# Patient Record
Sex: MALE | Race: WHITE | Employment: FULL TIME | URBAN - NONMETROPOLITAN AREA
[De-identification: names, ages, dates, MRNs, and addresses within clinical notes are randomized per-mention and may not be internally consistent; named-entity substitution may affect disease eponyms.]

---

## 2017-02-16 RX ORDER — CYCLOBENZAPRINE HCL 10 MG
10 TABLET ORAL EVERY 8 HOURS PRN
Qty: 40 TABLET | Refills: 1 | Status: SHIPPED | OUTPATIENT
Start: 2017-02-16 | End: 2018-02-26

## 2018-02-26 ENCOUNTER — OFFICE VISIT (OUTPATIENT)
Dept: FAMILY MEDICINE CLINIC | Age: 38
End: 2018-02-26
Payer: COMMERCIAL

## 2018-02-26 ENCOUNTER — TELEPHONE (OUTPATIENT)
Dept: FAMILY MEDICINE CLINIC | Age: 38
End: 2018-02-26

## 2018-02-26 VITALS
HEART RATE: 78 BPM | HEIGHT: 76 IN | OXYGEN SATURATION: 97 % | TEMPERATURE: 97.6 F | RESPIRATION RATE: 14 BRPM | WEIGHT: 237.4 LBS | SYSTOLIC BLOOD PRESSURE: 120 MMHG | DIASTOLIC BLOOD PRESSURE: 84 MMHG | BODY MASS INDEX: 28.91 KG/M2

## 2018-02-26 DIAGNOSIS — M79.89 SWELLING OF RIGHT INDEX FINGER: Primary | ICD-10-CM

## 2018-02-26 PROCEDURE — 99203 OFFICE O/P NEW LOW 30 MIN: CPT | Performed by: FAMILY MEDICINE

## 2018-02-26 PROCEDURE — 29130 APPL FINGER SPLINT STATIC: CPT | Performed by: FAMILY MEDICINE

## 2018-02-26 ASSESSMENT — PATIENT HEALTH QUESTIONNAIRE - PHQ9
1. LITTLE INTEREST OR PLEASURE IN DOING THINGS: 0
SUM OF ALL RESPONSES TO PHQ9 QUESTIONS 1 & 2: 0
SUM OF ALL RESPONSES TO PHQ QUESTIONS 1-9: 0
2. FEELING DOWN, DEPRESSED OR HOPELESS: 0

## 2018-02-26 NOTE — PROGRESS NOTES
Resp: 14   Temp: 97.6 °F (36.4 °C)   SpO2: 97%   Weight: 237 lb 6.4 oz (107.7 kg)   Height: 6' 4.42\" (1.941 m)       Physical Exam   Constitutional: He appears well-developed and well-nourished. HENT:   Head: Normocephalic and atraumatic. Mouth/Throat: Oropharynx is clear and moist.   Eyes: Conjunctivae are normal. Pupils are equal, round, and reactive to light. Neck: Neck supple. Cardiovascular: Normal rate, regular rhythm and normal heart sounds. Pulmonary/Chest: Effort normal and breath sounds normal. No respiratory distress. Abdominal: Soft. There is no tenderness. Musculoskeletal:   Right index finger swollen at MCP joint. Able to flex and extend finger. Normal range of motion of PIP and DIP joints. Mild limitation in range of motion at MCP joint likely secondary to swelling. No swelling of other MCP joints or any other joints on hand. No redness, warmth, or streaking to suggest infection. No obvious or palpable deformity. No locking, catching or palmar nodule. Neurological: He is alert. No obvious focal deficit. Fingers with normal sensation and capillary refill. Skin: Skin is warm and dry. No rash noted. Psychiatric: He has a normal mood and affect. His behavior is normal.   Vitals reviewed. A finger splint was applied. Splint was left long extending to base of palm and folded to length over finger and secured with coban. Patient tolerated and was comfortable with MCP joint effectively immobilized. No results found for: WBC, HGB, HCT, PLT, CHOL, TRIG, HDL, LDLDIRECT, ALT, AST, NA, K, CL, CREATININE, BUN, CO2, TSH, PSA, INR, GLUF, LABA1C, LABMICR    Assessment/Plan:   1. Swelling of right index finger  I suspect jammed finger. Nothing on exam to suggest fracture, although small chip fracture cannot be definitively excluded without imaging. X-ray ordered and patient will return for this later in the week. Finger splinted.   Encouraged rest, elevation, ibuprofen 600

## 2018-02-26 NOTE — TELEPHONE ENCOUNTER
Reinaldo Del Valle would like to establish PCP with Dr Davis.   Call wife, Eda Garza at [phone]

## 2018-03-13 ENCOUNTER — TELEPHONE (OUTPATIENT)
Dept: FAMILY MEDICINE CLINIC | Age: 38
End: 2018-03-13

## 2018-04-09 ENCOUNTER — OFFICE VISIT (OUTPATIENT)
Dept: FAMILY MEDICINE CLINIC | Age: 38
End: 2018-04-09
Payer: COMMERCIAL

## 2018-04-09 VITALS
WEIGHT: 235 LBS | SYSTOLIC BLOOD PRESSURE: 124 MMHG | OXYGEN SATURATION: 98 % | RESPIRATION RATE: 16 BRPM | DIASTOLIC BLOOD PRESSURE: 82 MMHG | BODY MASS INDEX: 27.75 KG/M2 | HEIGHT: 77 IN | HEART RATE: 58 BPM

## 2018-04-09 DIAGNOSIS — S63.659S: Primary | ICD-10-CM

## 2018-04-09 PROCEDURE — 99213 OFFICE O/P EST LOW 20 MIN: CPT | Performed by: FAMILY MEDICINE

## 2018-07-09 ENCOUNTER — TELEPHONE (OUTPATIENT)
Dept: FAMILY MEDICINE CLINIC | Age: 38
End: 2018-07-09

## 2018-07-09 DIAGNOSIS — S63.659S: Primary | ICD-10-CM

## 2018-07-10 ENCOUNTER — TELEPHONE (OUTPATIENT)
Dept: FAMILY MEDICINE CLINIC | Age: 38
End: 2018-07-10

## 2018-07-10 NOTE — TELEPHONE ENCOUNTER
Left message for pt to return phone call. Called him regarding his refferal to [facility]. Made his appointment for tomorrow 7/10/18 at 12:30. If this does not work for pt, have pt call [facility] to reschedule.

## 2020-03-20 NOTE — TELEPHONE ENCOUNTER
Spoke with wife Fortino Olivas regarding the establishment with Dr Amber Gill patient with viral symptoms clinically.  RVP sent along with covid19 test.  otherwise non-toxic and well-appearing in no respiratory distress.  lungs clear.  cxr done without infiltrate.  suspect viral process vs covid19.  patient has been given education on coronavirus and testing education.  patient has been advised to self-quarantine for 14 days and given return precautions for any concerning or worsening of their symptoms.  motrin for headache.  return precautions discussed

## 2021-11-16 ENCOUNTER — OFFICE VISIT (OUTPATIENT)
Dept: FAMILY MEDICINE CLINIC | Age: 41
End: 2021-11-16
Payer: COMMERCIAL

## 2021-11-16 VITALS
DIASTOLIC BLOOD PRESSURE: 84 MMHG | TEMPERATURE: 97.3 F | BODY MASS INDEX: 29.19 KG/M2 | HEART RATE: 80 BPM | SYSTOLIC BLOOD PRESSURE: 126 MMHG | RESPIRATION RATE: 18 BRPM | OXYGEN SATURATION: 98 % | WEIGHT: 243 LBS

## 2021-11-16 DIAGNOSIS — U07.1 COVID-19 VIRUS INFECTION: Primary | ICD-10-CM

## 2021-11-16 LAB
Lab: ABNORMAL
QC PASS/FAIL: ABNORMAL
SARS-COV-2 RDRP RESP QL NAA+PROBE: POSITIVE

## 2021-11-16 PROCEDURE — 87635 SARS-COV-2 COVID-19 AMP PRB: CPT | Performed by: NURSE PRACTITIONER

## 2021-11-16 PROCEDURE — 99213 OFFICE O/P EST LOW 20 MIN: CPT | Performed by: NURSE PRACTITIONER

## 2021-11-16 ASSESSMENT — PATIENT HEALTH QUESTIONNAIRE - PHQ9
2. FEELING DOWN, DEPRESSED OR HOPELESS: 0
SUM OF ALL RESPONSES TO PHQ9 QUESTIONS 1 & 2: 0
SUM OF ALL RESPONSES TO PHQ QUESTIONS 1-9: 0
1. LITTLE INTEREST OR PLEASURE IN DOING THINGS: 0

## 2021-11-16 NOTE — PATIENT INSTRUCTIONS
Patient Education        Coronavirus ([id number]): Care Instructions  Overview  The coronavirus disease (COVID-19) is caused by a virus. Symptoms may include a fever, a cough, and shortness of breath. It can spread through droplets from coughing and sneezing, breathing, and singing. The virus also can spread when people are in close contact with someone who is infected. Most people have mild symptoms and can take care of themselves at home. If their symptoms get worse, they may need care in a hospital. Treatment may include medicines to reduce symptoms, plus breathing support such as oxygen therapy or a ventilator. It's important to not spread the virus to others. If you have COVID-19, wear a mask anytime you are around other people. It can help stop the spread of the virus. You need to isolate yourself while you are sick. Leave your home only if you need to get medical care or testing. Follow-up care is a key part of your treatment and safety. Be sure to make and go to all appointments, and call your doctor if you are having problems. It's also a good idea to know your test results and keep a list of the medicines you take. How can you care for yourself at home? · Get extra rest. It can help you feel better. · Drink plenty of fluids. This helps replace fluids lost from fever. Fluids may also help ease a scratchy throat. · You can take acetaminophen (Tylenol) or ibuprofen (Advil, Motrin) to reduce a fever. It may also help with muscle and body aches. Read and follow all instructions on the label. · Use petroleum jelly on sore skin. This can help if the skin around your nose and lips becomes sore from rubbing a lot with tissues. If you use oxygen, use a water-based product instead of petroleum jelly. · Keep track of symptoms such as fever and shortness of breath. This can help you know if you need to call your doctor. It can also help you know when it's safe to be around other people.   · In some cases, your doctor might suggest that you get a pulse oximeter. How can you self-isolate when you have COVID-19? If you have COVID-19, there are things you can do to help avoid spreading the virus to others. · Limit contact with people in your home. If possible, stay in a separate bedroom and use a separate bathroom. · Wear a mask when you are around other people. · If you have to leave home, avoid crowds and try to stay at least 6 feet away from other people. · Avoid contact with pets and other animals. · Cover your mouth and nose with a tissue when you cough or sneeze. Then throw it in the trash right away. · Wash your hands often, especially after you cough or sneeze. Use soap and water, and scrub for at least 20 seconds. If soap and water aren't available, use an alcohol-based hand . · Don't share personal household items. These include bedding, towels, cups and glasses, and eating utensils. · [address] in the warmest water allowed for the fabric type, and dry it completely. It's okay to wash other people's laundry with yours. · Clean and disinfect your home. Use household  and disinfectant wipes or sprays. When can you end self-isolation for COVID-19? If you know or think that you have the virus, you will need to self-isolate. You can be around others after:  · It's been at least 10 days since your symptoms started and  · You haven't had a fever for 24 hours without taking medicines to lower the fever and  · Your symptoms are improving. If you tested positive but have no symptoms, you can end isolation after 10 days. But if you start to have symptoms, follow the steps above. Ask your doctor if you need to be tested before you end isolation. This is especially important if you have a weakened immune system. When should you call for help? Call 911 anytime you think you may need emergency care.  For example, call if you have life-threatening symptoms, such as:    · You have severe trouble breathing. (You can't talk at all.)     · You have constant chest pain or pressure.     · You are severely dizzy or lightheaded.     · You are confused or can't think clearly.     · You have pale, gray, or blue-colored skin or lips.     · You pass out (lose consciousness) or are very hard to wake up. Call your doctor now or seek immediate medical care if:    · You have moderate trouble breathing. (You can't speak a full sentence.)     · You are coughing up blood (more than about 1 teaspoon).     · You have signs of low blood pressure. These include feeling lightheaded; being too weak to stand; and having cold, pale, clammy skin. Watch closely for changes in your health, and be sure to contact your doctor if:    · Your symptoms get worse.     · You are not getting better as expected.     · You have new or worse symptoms of anxiety, depression, nightmares, or flashbacks. Call before you go to the doctor's office. Follow their instructions. And wear a mask. Current as of: July 1, 2021               Content Version: 13.0  © 2006-2021 [employer]. Care instructions adapted under license by [facility] ([facility]). If you have questions about a medical condition or this instruction, always ask your healthcare professional. [address] any warranty or liability for your use of this information.

## 2021-11-16 NOTE — PROGRESS NOTES
[address] [address]  Dept: [phone]  Dept Fax: [phone]  Loc: [address] [place] [address]       Chief Complaint   Patient presents with    Chills     x3 days    Congestion    Dizziness       Nurses Notes reviewed and I agree except as noted in the HPI. HISTORY OF PRESENT ILLNESS   Lidia Kevin is a 46 y.o. male who presents with c/o cough. Onset 3 days ago, unchanged. Cough is dry. Associated sinus congestion and dizziness. Dizziness intermittent. He also c/o subjective fever including chills. Exposure to COVID-19. No improvement with current treatment. REVIEW OF SYSTEMS     Review of Systems    PAST MEDICAL HISTORY         Diagnosis Date    Torn meniscus     bilateral       SURGICAL HISTORY     Patient  has a past surgical history that includes knee surgery (Bilateral). CURRENT MEDICATIONS       No outpatient medications prior to visit. No facility-administered medications prior to visit. ALLERGIES     Patient is has No Known Allergies. FAMILY HISTORY     Patient's family history is not on file. SOCIAL HISTORY     Patient  reports that he has never smoked. He has never used smokeless tobacco. He reports current alcohol use. He reports that he does not use drugs. PHYSICAL EXAM     VITALS  BP: 126/84, Temp: 97.3 °F (36.3 °C), Pulse: 80, Resp: 18, SpO2: 98 %  Physical Exam    DIAGNOSTIC RESULTS   Labs:  Results for orders placed or performed in visit on 11/16/21   POCT COVID-19, Rapid   Result Value Ref Range    SARS-COV-2, RdRp gene Positive (A) Negative    Lot Number [id number]     QC Pass/Fail pass        IMAGING:  No orders to display       No images are attached to the encounter or orders placed in the encounter.     CLINICAL COURSE COURSE:     Vitals:    11/16/21 1002   BP: 126/84   Site: Left Upper Arm   Pulse: 80   Resp: 18   Temp: 97.3 °F (36.3 °C)   TempSrc: Skin   SpO2: 98%   Weight: 243 lb (110.2 kg)         PROCEDURES:  None  FINAL IMPRESSION      1. COVID-19 virus infection         DISPOSITION/PLAN     Non-toxic, no distress. COVID positive. Patient discharged home in stable condition. OTC med as needed. Discharge instructions given by staff. PATIENT REFERRED TO:    Follow up with PCP as needed.     DISCHARGE MEDICATIONS:  New Prescriptions    No medications on file         Electronically signed by GISSELL Walker CNP on 11/16/2021 at 12:23 PM

## 2021-11-16 NOTE — LETTER
NOTIFICATION RETURN TO WORK / SCHOOL    11/16/2021    Mr. Artie Villagran  [facility] [address] [id number]      To Whom It May Concern:    Artie Villagran was tested for COVID-19 on 11/16, and the result was positive. He may return to work on 11/24. I recommend:return without restrictions    If there are questions or concerns, please have the patient contact our office.         Sincerely,      GISSELL Weiss - CNP

## 2022-12-22 ENCOUNTER — OFFICE VISIT (OUTPATIENT)
Dept: FAMILY MEDICINE CLINIC | Age: 42
End: 2022-12-22

## 2022-12-22 VITALS
BODY MASS INDEX: 31.34 KG/M2 | HEART RATE: 98 BPM | DIASTOLIC BLOOD PRESSURE: 76 MMHG | WEIGHT: 265.4 LBS | TEMPERATURE: 98 F | OXYGEN SATURATION: 97 % | SYSTOLIC BLOOD PRESSURE: 120 MMHG | HEIGHT: 77 IN | RESPIRATION RATE: 16 BRPM

## 2022-12-22 DIAGNOSIS — B96.89 ACUTE BACTERIAL SINUSITIS: Primary | ICD-10-CM

## 2022-12-22 DIAGNOSIS — J01.90 ACUTE BACTERIAL SINUSITIS: Primary | ICD-10-CM

## 2022-12-22 PROBLEM — S63.659S: Status: RESOLVED | Noted: 2018-04-09 | Resolved: 2022-12-22

## 2022-12-22 PROCEDURE — 99213 OFFICE O/P EST LOW 20 MIN: CPT | Performed by: NURSE PRACTITIONER

## 2022-12-22 RX ORDER — AMOXICILLIN AND CLAVULANATE POTASSIUM 875; 125 MG/1; MG/1
1 TABLET, FILM COATED ORAL 2 TIMES DAILY
Qty: 14 TABLET | Refills: 0 | Status: SHIPPED | OUTPATIENT
Start: 2022-12-22 | End: 2022-12-29

## 2022-12-22 ASSESSMENT — ENCOUNTER SYMPTOMS
EYE DISCHARGE: 0
EYE ITCHING: 0
BLOOD IN STOOL: 0
FACIAL SWELLING: 0
VOMITING: 0
ABDOMINAL PAIN: 0
TROUBLE SWALLOWING: 0
EYE REDNESS: 0
DIARRHEA: 0
EYE PAIN: 0
CHEST TIGHTNESS: 0
BACK PAIN: 0
SINUS PRESSURE: 1
ABDOMINAL DISTENTION: 0
COUGH: 0
CONSTIPATION: 0
COLOR CHANGE: 0
RECTAL PAIN: 0
SINUS PAIN: 1
SHORTNESS OF BREATH: 0
NAUSEA: 0
SORE THROAT: 0
WHEEZING: 0

## 2022-12-22 ASSESSMENT — PATIENT HEALTH QUESTIONNAIRE - PHQ9
SUM OF ALL RESPONSES TO PHQ QUESTIONS 1-9: 0
1. LITTLE INTEREST OR PLEASURE IN DOING THINGS: 0
SUM OF ALL RESPONSES TO PHQ QUESTIONS 1-9: 0
SUM OF ALL RESPONSES TO PHQ QUESTIONS 1-9: 0
2. FEELING DOWN, DEPRESSED OR HOPELESS: 0
SUM OF ALL RESPONSES TO PHQ QUESTIONS 1-9: 0
SUM OF ALL RESPONSES TO PHQ9 QUESTIONS 1 & 2: 0

## 2022-12-22 NOTE — PROGRESS NOTES
[address] [address]  Dept: [phone]  Dept Fax: [phone]  Loc: [phone]      Sanchez Harry is a 38 y.o. male who presents todayfor Sinus Problem (Sx for 1 week, sinus headaches ) and Congestion      HPI:      Sinus congestion  Nasal congestion. Facial pain and headache. Symptoms have been going on for longer than 7 days. The patient has No Known Allergies. Past MedicalHistory  Zohra Hopkins  has a past medical history of Torn meniscus. Medications    Current Outpatient Medications:     Pseudoephedrine-Ibuprofen (ADVIL COLD/SINUS PO), Take by mouth, Disp: , Rfl:     amoxicillin-clavulanate (AUGMENTIN) 875-125 MG per tablet, Take 1 tablet by mouth 2 times daily for 7 days, Disp: 14 tablet, Rfl: 0    Subjective:      Review of Systems   Constitutional:  Positive for fatigue. Negative for activity change, appetite change, fever and unexpected weight change. HENT:  Positive for congestion, sinus pressure and sinus pain. Negative for dental problem, ear pain, facial swelling, mouth sores, postnasal drip, sore throat and trouble swallowing. Eyes:  Negative for pain, discharge, redness, itching and visual disturbance. Respiratory:  Negative for cough, chest tightness, shortness of breath and wheezing. Cardiovascular:  Negative for chest pain, palpitations and leg swelling. Gastrointestinal:  Negative for abdominal distention, abdominal pain, blood in stool, constipation, diarrhea, nausea, rectal pain and vomiting. Endocrine: Negative for cold intolerance and heat intolerance. Genitourinary:  Negative for difficulty urinating, dysuria, frequency, hematuria, penile pain, scrotal swelling, testicular pain and urgency. Musculoskeletal:  Negative for arthralgias, back pain, gait problem and neck pain. Skin:  Negative for color change, rash and wound.    Neurological:  Positive for headaches. Negative for dizziness, seizures, weakness, light-headedness and numbness. Psychiatric/Behavioral:  Negative for agitation and sleep disturbance. Objective:        Vitals:    12/22/22 1303   BP: 120/76   Site: Right Upper Arm   Position: Sitting   Cuff Size: Large Adult   Pulse: 98   Resp: 16   Temp: 98 °F (36.7 °C)   TempSrc: Oral   SpO2: 97%   Weight: 265 lb 6.4 oz (120.4 kg)   Height: 6' 4.5\" (1.943 m)      Physical Exam  Vitals reviewed. Constitutional:       General: He is not in acute distress. Appearance: Normal appearance. He is not ill-appearing or toxic-appearing. HENT:      Head: Normocephalic and atraumatic. Right Ear: Tympanic membrane and ear canal normal.      Left Ear: Tympanic membrane and ear canal normal.      Nose: Congestion (sounds nasally) present. Right Sinus: Maxillary sinus tenderness and frontal sinus tenderness present. Left Sinus: Maxillary sinus tenderness and frontal sinus tenderness present. Mouth/Throat:      Pharynx: Oropharynx is clear. Posterior oropharyngeal erythema present. Eyes:      Pupils: Pupils are equal, round, and reactive to light. Pulmonary:      Effort: Pulmonary effort is normal.      Breath sounds: Normal breath sounds. Musculoskeletal:      Cervical back: Normal range of motion and neck supple. No rigidity or tenderness. Skin:     General: Skin is warm. Neurological:      Mental Status: He is alert. Assessment/Plan:       Alejos Rinne was seen today for sinus problem and congestion. Diagnoses and all orders for this visit:    Acute bacterial sinusitis  -     amoxicillin-clavulanate (AUGMENTIN) 875-125 MG per tablet; Take 1 tablet by mouth 2 times daily for 7 days    Patient nontoxic-appearing and in no acute distress. Exam consistent with bacterial sinusitis. Patient's symptoms have been ongoing for 7 to 10 days, with no relief at this point with over-the-counter medications.   Augmentin being prescribed  Patient instructed to increase fluids and rest. Use Tylenol and or Ibuprofen for fever or pain control. Good hand washing encouraged. Health maintenance labs and vaccines declined at today's visit. Return in about 1 week (around 12/29/2022), or if symptoms worsen or fail to improve. Patient instructions given and reviewed.     Electronicallysigned by GISSELL Reed CNP on 12/22/2022 at 1:29 PM

## 2022-12-29 ENCOUNTER — HOSPITAL ENCOUNTER (EMERGENCY)
Age: 42
Discharge: HOME | End: 2022-12-29
Attending: EMERGENCY MEDICINE
Payer: COMMERCIAL

## 2022-12-29 VITALS
DIASTOLIC BLOOD PRESSURE: 108 MMHG | BODY MASS INDEX: 30.7 KG/M2 | RESPIRATION RATE: 16 BRPM | WEIGHT: 260 LBS | SYSTOLIC BLOOD PRESSURE: 167 MMHG | OXYGEN SATURATION: 98 % | HEIGHT: 77 IN | HEART RATE: 78 BPM | TEMPERATURE: 97.4 F

## 2022-12-29 DIAGNOSIS — B37.2 YEAST DERMATITIS: Primary | ICD-10-CM

## 2022-12-29 DIAGNOSIS — T78.40XA ALLERGIC REACTION TO DRUG, INITIAL ENCOUNTER: ICD-10-CM

## 2022-12-29 PROCEDURE — 99283 EMERGENCY DEPT VISIT LOW MDM: CPT

## 2022-12-29 RX ORDER — FLUCONAZOLE 100 MG/1
200 TABLET ORAL EVERY OTHER DAY
Qty: 7 TABLET | Refills: 0 | Status: SHIPPED | OUTPATIENT
Start: 2022-12-29 | End: 2023-01-11

## 2022-12-29 ASSESSMENT — PAIN - FUNCTIONAL ASSESSMENT
PAIN_FUNCTIONAL_ASSESSMENT: NONE - DENIES PAIN
PAIN_FUNCTIONAL_ASSESSMENT: NONE - DENIES PAIN

## 2022-12-29 NOTE — ED TRIAGE NOTES
Arrives to ER for the evaluation of itchy rash to bilateral arms, torso and back. Patient states he was recently sick with flu and is on his last day of Augmentin. States he was prescribed Econazole cream today to treat \"jock itch\" because at home tx was not working. Denies medications allergies. BP elevated. Respirations unlabored. No swelling to face, lips, tongue, throat. Waiting provider to assess. Will monitor.

## 2022-12-29 NOTE — DISCHARGE INSTRUCTIONS
Please stop Augmentin  Please clean groins with lukewarm water, dry and exposed to air at home  Please start Diflucan 1 every other day  Please return if worse or new symptoms

## 2022-12-29 NOTE — ED PROVIDER NOTES
[address]          Pt Name: Ayaka Holland  MRN: [id number]  [place] 1980  Date of evaluation: 12/29/2022  Physician: Sindhu Posada MD,       [address] [place] [address]       Chief Complaint   Patient presents with    Rash     Itching          HISTORY OF PRESENT ILLNESS    HPI  Ayaka Holland is a 38 y.o. male who presented via private vehicle with above-mentioned complaint. He was started on Augmentin a week ago for upper respiratory symptoms. Today is his last dose. He woke up this morning with diffuse rash and itching. His respiratory symptoms solved. He denies fever or chills. He denies difficulty breathing or swallowing. He also presented with 10-day history of rash in both groins. He described as \"jock itch\" started before using Augmentin. He has been using over-the-counter topical ointments without benefit. He has no significant past medical history. REVIEW OF SYSTEMS   Negative except as mentioned above      PAST MEDICAL AND SURGICAL HISTORY     Patient Active Problem List   Diagnosis Code   (none) - all problems resolved or deleted     Past Medical History:   Diagnosis Date    Torn meniscus     bilateral     Past Surgical History:   Procedure Laterality Date    KNEE SURGERY Bilateral          MEDICATIONS   No current facility-administered medications for this encounter.     Current Outpatient Medications:     econazole nitrate 1 % cream, , Disp: , Rfl:     fluconazole (DIFLUCAN) 100 MG tablet, Take 2 tablets by mouth every other day for 7 doses, Disp: 7 tablet, Rfl: 0    Pseudoephedrine-Ibuprofen (ADVIL COLD/SINUS PO), Take by mouth, Disp: , Rfl:     amoxicillin-clavulanate (AUGMENTIN) 875-125 MG per tablet, Take 1 tablet by mouth 2 times daily for 7 days, Disp: 14 tablet, Rfl: 0    Previous Medications    AMOXICILLIN-CLAVULANATE (AUGMENTIN) 875-125 MG PER TABLET    Take 1 tablet by mouth 2 times daily for 7 days ECONAZOLE NITRATE 1 % CREAM        PSEUDOEPHEDRINE-IBUPROFEN (ADVIL COLD/SINUS PO)    Take by mouth         SOCIAL HISTORY     Social History     Social History Narrative    Not on file     Social History     Tobacco Use    Smoking status: Never    Smokeless tobacco: Never   Vaping Use    Vaping Use: Never used   Substance Use Topics    Alcohol use: Yes     Comment: occasionally    Drug use: No         ALLERGIES   No Known Allergies      FAMILY HISTORY   History reviewed. No pertinent family history. PHYSICAL EXAM     ED Triage Vitals [12/29/22 1114]   BP Temp Temp Source Heart Rate Resp SpO2 Height Weight   (!) 167/108 97.4 °F (36.3 °C) Temporal 78 16 98 % 6' 4.5\" (1.943 m) 260 lb (117.9 kg)     Initial vital signs and nursing assessment reviewed and abnormal from high blood pressure . Body mass index is 31.24 kg/m². Pulsoximetry is normal per my interpretation. Additional Vital Signs:  Vitals:    12/29/22 1114   BP: (!) 167/108   Pulse: 78   Resp: 16   Temp: 97.4 °F (36.3 °C)   SpO2: 98%       Physical Exam  Vitals and nursing note reviewed. Constitutional:       General: He is not in acute distress. Appearance: He is well-developed. HENT:      Head: Atraumatic. Nose: Congestion present. No rhinorrhea. Mouth/Throat:      Pharynx: No posterior oropharyngeal erythema. Eyes:      Conjunctiva/sclera: Conjunctivae normal.      Pupils: Pupils are equal, round, and reactive to light. Neck:      Thyroid: No thyromegaly. Vascular: No JVD. Trachea: No tracheal deviation. Cardiovascular:      Rate and Rhythm: Normal rate and regular rhythm. Heart sounds: No murmur heard. No friction rub. No gallop. Pulmonary:      Effort: Pulmonary effort is normal. No respiratory distress. Breath sounds: Normal breath sounds. No wheezing. Abdominal:      General: Bowel sounds are normal.      Palpations: Abdomen is soft. Tenderness: There is no abdominal tenderness. Genitourinary:     Comments: There is redness and maceration in both groins, genitalia were normal.  Musculoskeletal:      Cervical back: Neck supple. Comments: There is diffuse maculopapular pink blanchable lesions covering the entire body. There was few target lesions. Neurological:      Mental Status: He is alert. PREVIOUS RECORDS  AND EXTERNAL INFORMATION REVIEWED   History obtained from chart review and the patient. Pertinent previous records reviewed: [facility] MEDICAL DECISION MAKING       Please see ED course section below for continuation and resolution of this initial assessment. Comorbid conditions pertinent to this ED encounter:  None      Differential Diagnosis includes but is not limited to:  Reaction to Augmentin, yeast infection and groins      Plan:   As Below r            ED RESULTS   Laboratory results (none if blank):  Labs Reviewed - No data to display  (Any cultures that may have been sent were not resulted at the time of this patient ED visit)  See ED course below for lab results interpretation if applicable. Radiologic studies results available at the moment of this note (None if blank): No orders to display     All radiology images reviewed by me in addition to interpretation provided by the radiologist.  See ED course below for radiology results interpretation if applicable. EKG interpretation (none if blank):        ED COURSE   ED Medications administered this visit (None if left blank):   Medications - No data to display             CRITICAL CARE:      PROCEDURES: (None if blank)  Procedures:     MEDICATION CHANGES     New Prescriptions    FLUCONAZOLE (DIFLUCAN) 100 MG TABLET    Take 2 tablets by mouth every other day for 7 doses         FINAL DISPOSITION       MDM  Patient presents with rash which appears to be allergic reaction to Augmentin. Also have yeast dermatitis in both groins.   He was advised to stop Augmentin and apply local care to his groins. He was prescribed Diflucan. I discussed with him topical skin care as well. FINAL DIAGNOSES:  Final diagnoses:   Yeast dermatitis   Allergic reaction to drug, initial encounter       Condition: condition: good  Dispo: Discharge to home  DISPOSITION Decision To Discharge 12/29/2022 11:41:07 AM      Outpatient follow up (If applicable):  Nalini Love MD  [address] [place] [address]  [phone]    In 2 days          This transcription was electronically signed. It was dictated by use of voice recognition software and electronically transcribed. The transcription may contain errors not detected in proofreading.         Brigido Perdue MD  12/29/22 0057